# Patient Record
Sex: MALE | Race: OTHER | HISPANIC OR LATINO | URBAN - METROPOLITAN AREA
[De-identification: names, ages, dates, MRNs, and addresses within clinical notes are randomized per-mention and may not be internally consistent; named-entity substitution may affect disease eponyms.]

---

## 2022-07-26 ENCOUNTER — EMERGENCY (EMERGENCY)
Facility: HOSPITAL | Age: 22
LOS: 1 days | Discharge: HOME | End: 2022-07-26
Attending: EMERGENCY MEDICINE | Admitting: EMERGENCY MEDICINE
Payer: COMMERCIAL

## 2022-07-26 VITALS
RESPIRATION RATE: 16 BRPM | HEART RATE: 78 BPM | OXYGEN SATURATION: 98 % | TEMPERATURE: 98 F | DIASTOLIC BLOOD PRESSURE: 78 MMHG | SYSTOLIC BLOOD PRESSURE: 125 MMHG

## 2022-07-26 VITALS
SYSTOLIC BLOOD PRESSURE: 157 MMHG | OXYGEN SATURATION: 100 % | DIASTOLIC BLOOD PRESSURE: 82 MMHG | WEIGHT: 199.96 LBS | HEIGHT: 72 IN | TEMPERATURE: 98 F | RESPIRATION RATE: 18 BRPM | HEART RATE: 93 BPM

## 2022-07-26 DIAGNOSIS — R07.81 PLEURODYNIA: ICD-10-CM

## 2022-07-26 DIAGNOSIS — R07.89 OTHER CHEST PAIN: ICD-10-CM

## 2022-07-26 PROCEDURE — 93010 ELECTROCARDIOGRAM REPORT: CPT

## 2022-07-26 PROCEDURE — 99283 EMERGENCY DEPT VISIT LOW MDM: CPT | Mod: 25

## 2022-07-26 PROCEDURE — 71046 X-RAY EXAM CHEST 2 VIEWS: CPT | Mod: 26

## 2022-07-26 PROCEDURE — 99284 EMERGENCY DEPT VISIT MOD MDM: CPT | Mod: 25

## 2022-07-26 PROCEDURE — 93005 ELECTROCARDIOGRAM TRACING: CPT

## 2022-07-26 PROCEDURE — 71046 X-RAY EXAM CHEST 2 VIEWS: CPT

## 2022-07-26 RX ORDER — IBUPROFEN 200 MG
800 TABLET ORAL ONCE
Refills: 0 | Status: COMPLETED | OUTPATIENT
Start: 2022-07-26 | End: 2022-07-26

## 2022-07-26 RX ORDER — IBUPROFEN 200 MG
1 TABLET ORAL
Qty: 20 | Refills: 0
Start: 2022-07-26

## 2022-07-26 RX ADMIN — Medication 800 MILLIGRAM(S): at 13:26

## 2022-07-26 NOTE — ED ADULT NURSE NOTE - OBJECTIVE STATEMENT
pt received into spot B A&Ox4 amb appears comfortable arrives via walk in triage for eval of chest discomfort x 4 days respirations unlabored 12 lead ekg done NSR noted no abd pain n/v/d constipation

## 2022-07-26 NOTE — ED PROVIDER NOTE - CLINICAL SUMMARY MEDICAL DECISION MAKING FREE TEXT BOX
Impression: pleuritic left sided cp x 4 days, with no other associated sx's. Afebrile. HDS. EKG non-ischemic w/ no signs of pericarditis. CXR neg for i/e/ptx. PERC neg. Sx's atypical for acs with no risk factors. Suspect likely msk pain. ED evaluation and management discussed with the patient in detail.  NSAIDS and close PMD follow up encouraged.  Strict ED return instructions discussed in detail and patient given the opportunity to ask any questions about their discharge diagnosis and instructions. Patient verbalized understanding.

## 2022-07-26 NOTE — ED PROVIDER NOTE - PATIENT PORTAL LINK FT
You can access the FollowMyHealth Patient Portal offered by [facility] by registering at the following website: [email or web address]. By joining [facility]’s FollowMyHealth portal, you will also be able to view your health information using other applications (apps) compatible with our system.

## 2022-07-26 NOTE — ED PROVIDER NOTE - OBJECTIVE STATEMENT
Pt is a 20yo m, no pmh, who p/w intermittent left sided cp x 4 days, now becoming more constant, sharp, occurring w/ deep inspiration only. Sx's non-positional, exertional, related to eating... No sob, palp, dizziness, syncope, radiation, sweating, nausea, vomiting... No leg pain, swelling, prolonged immobility. No f/c, uri sx's. No chest trauma. No fam h/o cad or thromboembolism.  (Acacia) used for translation.

## 2022-07-26 NOTE — ED PROVIDER NOTE - NSFOLLOWUPINSTRUCTIONS_ED_ALL_ED_FT
[place] ibuprofeno según sea necesario para el dolor.  Ander un seguimiento con un médico de atención primaria para nahomi reevaluación. Un coordinador de referencias se pondrá en contacto con usted y programará nahomi thompson para usted.  Volver a urgencias por cualquier síntoma nuevo o que empeore (aumento del dolor, dificultad para respirar, mareos, desmayos...).                  Log Out.      [employer]® CareNotes®     :  [facility]  	                     CHEST PAIN - General Information           Dolor de pecho    LO QUE NECESITA SABER:    ¿Qué necesito saber acerca del dolor de pecho?El dolor en el pecho puede ser provocado por nahomi variedad de condiciones, algunas no tan serias y otras que son de peligro mortal. Es importante que programe nahomi thompson con tim médico para determinar la causa de tim dolor de pecho.    ¿Qué provoca o aumenta mi riesgo de tener dolor de pecho?  •Un problema de digestión, neetu el reflujo ácido o nahomi úlcera estomacal      •Un ataque de ansiedad o nahomi emoción teto, neetu la shawanda      •Nahomi infección, inflamación o fractura en los huesos o un cartílago en el pecho      •Flujo de wicho deficiente hacia el corazón (angina)      •Nahomi condición potencialmente mortal, neetu un ataque al corazón o un coágulo de wicho en los pulmones      ¿Cuáles otros síntomas podrían presentarse con el dolor en el pecho?  •Nahomi sensación de ardor detrás del esternón      •Ritmo cardíaco acelerado o lento      •Fiebre o sudoración      •Náuseas o vómitos      •Falta de aliento      •Molestia o presión que se propaga del pecho a la espalda, mandíbula o brazo      •Sensación de debilidad, cansancio o desmayo      ¿Cómo se diagnostica la causa del dolor en el pecho?Tim médico lo examinará. Describa tim dolor en el pecho con el erna detalle que sea posible. Dígale dónde le duele y cuándo empezó el dolor. Coméntele si usted nota algo que hace empeorar o mejorar el dolor. Además infórmele si el dolor es mariusz o intermitente. Incluya también cualquier otro síntoma que tenga junto con el dolor en el pecho, neetu sudoración o náuseas. Itm médico le preguntará cuáles son los medicamentos que jules y acerca de cualquier condición médica que tenga. Dígale si tiene antecedentes familiares enfermedad cardíaca. Es posible que también deba hacerse alguno de los siguientes exámenes:  •Un electrocardiogramaes un examen que registra la actividad eléctrica de tim corazón.      •Los análisis de sangrerevisan si hay daños en el corazón y signos de ataque cardíaco.      •Un ecocardiogramausa ondas de russell para derik si la wicho fluye normalmente a través del corazón.      •Un ultrasonido, nahomi radiografía, nahomi tomografía computarizada o nahomi imagen por resonancia magnética (IRM)podría mostrar la causa de tim dolor de pecho. Es posible que le administren líquido de contraste para que tim corazón se larry mejor en las imágenes. Dígale al médico si usted alguna vez ha tenido nahomi reacción alérgica al líquido de contraste. No entre a la jonas donde se realiza la resonancia magnética con algo de metal. El metal puede causar lesiones serias. Informe a tim médico si usted tiene algún metal dentro o sobre tim cuerpo.      •Nahomi endoscopiapuede hacerse para revisar si tiene úlceras o problemas con el esófago.  Endoscopia superior           ¿Cómo se trata el dolor en el pecho?  •Los medicamentospueden administrarse para tratar la causa del dolor de pecho. Por ejemplo, analgésicos, medicamentos para la ansiedad o medicamentos para aumentar el flujo de wicho al corazón. No tome ciertos medicamentos sin antes preguntarle a tim médico. Estos incluyen EKATERINA, suplementos vitamínicos y hormonas, neetu el estrógeno o la progestina.      •Un stentpodría colocarse si el dolor de pecho es causado por nahomi obstrucción en el corazón. Un stent es un pequeño tubo elaborado en lolis de alambre que ayuda a mantener abierta la arteria. Usted podría necesitar más de 1 stent.  Angioplastia de la arteria coronaria (stent)           ¿Cuáles son algunos consejos para vivir nahomi joaquin shanda?Si se conoce la causa de tim dolor de pecho, tim médico le dará pautas específicas a seguir. Los siguientes son consejos generales de lo:  •No fume.La nicotina y otros químicos contenidos en los cigarrillos y cigarros pueden causar daño a cris pulmones y el corazón. Pida información a tim médico si usted actualmente fuma y necesita ayuda para dejar de fumar. Los cigarrillos electrónicos o el tabaco sin humo igualmente contienen nicotina. Consulte con tim médico antes de utilizar estos productos.      •Elija nahomi variedad de alimentos saludables tan a menudo neetu sea posible.Incluya frutas y verduras frescas, congeladas o enlatadas. También incluya productos lácteos bajos en grasa, pescado, errol (sin piel) y arleth magras. Tim médico o dietista pueden ayudarlo a crear planes de alimentos. Es posible que tenga que evitar ciertos alimentos o bebidas si el dolor es causado por un problema de digestión.  Alimentos saludables           •Reduzca el consumo de sodio (sal).Limite el consumo de alimentos altos en sodio, neetu comidas enlatadas, bocadillos salados y embutidos. Si añade marj cuando cocina la comida, no añada más en la ferrer. Elija alimentos enlatados bajos en sodio tanto neetu sea posible.             •Consuma abundante agua al día.El agua ayuda al cuerpo a controlar la temperatura y la presión arterial. Pregunte a tim médico cuál es la cantidad de agua que debería consumir cada día.      •Pregunte acerca de la actividad.Tim médico le dirá cuáles actividades limitar y cuáles evitar. Pregunte cuándo puede manejar, regresar a tim trabajo y tener relaciones sexuales. Pida más información acerca de un plan de ejercicio adecuado para usted.      •Mantenga un peso saludable.Pregúntele a tim médico cuál es el peso ideal para usted. Pídale que lo ayude a crear un plan para bajar de peso si tiene sobrepeso.      •Pregunte sobre las vacunas que pudiera necesitar.Tim médico puede indicarle qué vacunas necesita y cuándo aplicárselas. Las siguientes vacunas ayudan a prevenir enfermedades que pueden llegar a ser graves para nahomi persona con nahomi afección cardíaca:?La vacuna contra la gripe se aplica todos los años.Vacúnese contra la gripe tan pronto neetu se recomiende, normalmente en septiembre u octubre.      ?La vacuna contra la neumonía generalmente se aplica cada 5 años.Tim médico puede recomendarle la vacuna contra la neumonía si tiene 65 años o más.      ?Las vacunas contra la COVID-19 se administran a los adultos en forma de inyección en 1 o 2 dosis.Se recomienda la vacunación para todos los adultos. También se recomienda nahomi dosis de refuerzo (adicional) para todos los adultos. Se recomienda nahomi segunda dosis refuerzo para todos los adultos de 50 años o más y para los adultos inmunodeprimidos de 18 años o más. La segunda dosis de refuerzo también se recomienda para los adultos que recibieron la vacuna de 1 dosis neetu primera dosis y de refuerzo. Tim médico puede decirle cuándo recibir nahomi o ambas dosis de refuerzo.      Evite la enfermedad cardíaca         Llame al número local de emergencias (911 en los [place]) o pídale a alguien que llame si:  •Tiene alguno de los siguientes signos de un ataque cardíaco: ?Estrujamiento, presión o tensión en tim pecho      ?Usted también podría presentar alguno de los siguientes: ?Malestar o dolor en tim espalda, sergei, mandíbula, abdomen, o brazo      ?Falta de aliento      ?Náuseas o vómitos      ?Desvanecimiento o sudor frío repentino            ¿Cuándo jd buscar atención inmediata?  •La inflamación en tim pecho empeora, aun con tratamiento.      •Usted tose o vomita wicho.      •Cris heces son negras o tienen wicho.      •Usted no puede dejar de vomitar o le duele al tragar.      ¿Cuándo jd llamar a mi médico?  •Usted tiene preguntas o inquietudes acerca de tim condición o cuidado.          ACUERDOS SOBRE TIM CUIDADO:    Usted tiene el derecho de ayudar a planear tim cuidado. Aprenda todo lo que pueda sobre tim condición y neetu darle tratamiento. Discuta cris opciones de tratamiento con cris médicos para decidir el cuidado que usted desea recibir. Usted siempre tiene el derecho de rechazar el tratamiento.       © Copyright [employer] 2022           back to top                          © Copyright [employer] 2022

## 2022-07-26 NOTE — ED PROVIDER NOTE - PHYSICAL EXAMINATION
VITAL SIGNS: I have reviewed nursing notes and confirm.  CONSTITUTIONAL: Well appearing, in no acute distress.   SKIN:  warm and dry, no acute rash.   HEAD:  normocephalic, atraumatic.  EYES: EOM intact; conjunctiva and sclera clear.  ENT: No nasal discharge; airway clear.   NECK: Supple.  CARD: S1, S2, Regular rate and rhythm.   RESP:  Clear to auscultation b/l, no wheezes, rales or rhonchi.  ABD: Normal bowel sounds; soft; non-distended; non-tender; no guarding/ rebound.  EXT: Normal ROM. No peripheral edema. No calf tenderness/ cords. Pulses intact and equal b/l.  NEURO: Alert, oriented, grossly unremarkable  PSYCH: Cooperative, mood and affect appropriate.